# Patient Record
Sex: MALE | Race: WHITE | NOT HISPANIC OR LATINO | Employment: STUDENT | URBAN - METROPOLITAN AREA
[De-identification: names, ages, dates, MRNs, and addresses within clinical notes are randomized per-mention and may not be internally consistent; named-entity substitution may affect disease eponyms.]

---
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
## 2024-02-12 ENCOUNTER — OFFICE VISIT (OUTPATIENT)
Age: 24
End: 2024-02-12
Payer: COMMERCIAL

## 2024-02-12 ENCOUNTER — APPOINTMENT (OUTPATIENT)
Age: 24
End: 2024-02-12
Payer: COMMERCIAL

## 2024-02-12 VITALS
SYSTOLIC BLOOD PRESSURE: 132 MMHG | DIASTOLIC BLOOD PRESSURE: 60 MMHG | OXYGEN SATURATION: 98 % | HEIGHT: 72 IN | WEIGHT: 190 LBS | RESPIRATION RATE: 18 BRPM | HEART RATE: 82 BPM | TEMPERATURE: 97.4 F | BODY MASS INDEX: 25.73 KG/M2

## 2024-02-12 DIAGNOSIS — S89.92XA INJURY OF LEFT KNEE, INITIAL ENCOUNTER: Primary | ICD-10-CM

## 2024-02-12 DIAGNOSIS — S89.92XA INJURY OF LEFT KNEE, INITIAL ENCOUNTER: ICD-10-CM

## 2024-02-12 PROCEDURE — 73564 X-RAY EXAM KNEE 4 OR MORE: CPT

## 2024-02-12 PROCEDURE — 99213 OFFICE O/P EST LOW 20 MIN: CPT | Performed by: EMERGENCY MEDICINE

## 2024-02-12 NOTE — PROGRESS NOTES
[facility]        NAME: Sebas Jeronimo is a 23 y.o. male  : 2000    MRN: [id number]  DATE: 2024  TIME: 4:51 PM    Assessment and Plan   Injury of left knee, initial encounter [S89.92XA]  1. Injury of left knee, initial encounter  XR knee 4+ vw left injury    Ambulatory Referral to Physical Therapy    Ambulatory Referral to Orthopedic Surgery    Elastic Bandages & Supports (Knee Brace/Hinged/Large) MISC      knee brace hinged type applied by RN.      Patient Instructions     Patient Instructions   Swollen Knee Joint   WHAT YOU NEED TO KNOW:   A swollen knee joint may be caused by arthritis or by an injury or trauma, such as a knee sprain. It may also happen if you exercise too much. It may be painful to bend or straighten your knee, or walk.  DISCHARGE INSTRUCTIONS:   Return to the emergency department if:   Your knee locks or gives way and you fall.     Your feet or toes start to look pale or feel cold.    You cannot bear weight on your leg, or you have severe pain even after treatment.    Contact your healthcare provider if:   You have a fever.     You have redness or warmth over your knee.     The swelling does not decrease with treatment.    It gets harder or more painful to straighten your leg at the knee.    Your knee weakens, or you continue to limp.    You have questions or concerns about your condition or care.    Medicines:   NSAIDs , such as ibuprofen, help decrease swelling, pain, and fever. This medicine is available with or without a doctor's order. NSAIDs can cause stomach bleeding or kidney problems in certain people. If you take blood thinner medicine, always ask your healthcare provider if NSAIDs are safe for you. Always read the medicine label and follow directions.    Take your medicine as directed.  Contact your healthcare provider if you think your medicine is not helping or if you have side effects. Tell your provider if you are allergic to any medicine. Keep a  list of the medicines, vitamins, and herbs you take. Include the amounts, and when and why you take them. Bring the list or the pill bottles to follow-up visits. Carry your medicine list with you in case of an emergency.    What you can do to manage your symptoms:   Rest your knee.  Avoid activities that make the swelling or pain worse. You may need to avoid putting weight on your knee while you have pain. Crutches, a cane, or a walker can be used to avoid putting weight on your knee while it heals.    Apply ice to your knee to help relieve pain and swelling.  Apply ice for 15 to 20 minutes every hour or as directed. Use an ice pack, or put crushed ice in a plastic bag. Cover it with a towel before you apply it to your knee. Ice helps prevent tissue damage and decreases swelling and pain.    Compress your knee with a brace or bandage to help reduce swelling.  Use a brace or bandage only as directed.    Elevate your knee above the level of your heart as often as you can.  This will help decrease swelling and pain. Prop your joint on pillows or blankets to keep it elevated comfortably.     Apply heat to your knee to relieve pain.  Apply heat for 20 to 30 minutes every 2 hours for as many days as directed. Heat helps decrease pain.    Go to physical therapy if directed.  A physical therapist teaches you exercises to help improve movement and strength, and to decrease pain.    Follow up with your doctor as directed:  Write down your questions so you remember to ask them during your visits.  © Copyright [employer] 2023 Information is for End User's use only and may not be sold, redistributed or otherwise used for commercial purposes.  The above information is an  only. It is not intended as medical advice for individual conditions or treatments. Talk to your doctor, nurse or pharmacist before following any medical regimen to see if it is safe and effective for you.      Follow up with PCP in 3-5  "days.  Proceed to  ER if symptoms worsen.    Chief Complaint     Chief Complaint   Patient presents with    Knee Pain     Injured left knee today around 1230 while doing [employer].          History of Present Illness       Patient complains of pain left knee since twisting injury to same while practicing [employer] earlier today.  Injury was associated with a painful \"crack\".  Patient denies prior knee problems.    Knee Pain   Pertinent negatives include no numbness.       Review of Systems   Review of Systems   Constitutional:  Negative for chills and fever.   Musculoskeletal:  Positive for arthralgias and joint swelling. Negative for gait problem and myalgias.   Skin:  Negative for color change, rash and wound.   Neurological:  Negative for numbness and headaches.         Current Medications       Current Outpatient Medications:     Elastic Bandages & Supports (Knee Brace/Hinged/Large) MISC, Use daily, Disp: 1 each, Rfl: 0    Current Allergies     Allergies as of 02/12/2024    (No Known Allergies)            The following portions of the patient's history were reviewed and updated as appropriate: allergies, current medications, past family history, past medical history, past social history, past surgical history and problem list.     History reviewed. No pertinent past medical history.    History reviewed. No pertinent surgical history.    Family History   Problem Relation Age of Onset    No Known Problems Mother     No Known Problems Father          Medications have been verified.        Objective   /60   Pulse 82   Temp (!) 97.4 °F (36.3 °C)   Resp 18   Ht 6' (1.829 m)   Wt 86.2 kg (190 lb)   SpO2 98%   BMI 25.77 kg/m²        Physical Exam     Physical Exam  Vitals and nursing note reviewed.   Constitutional:       General: He is not in acute distress.     Appearance: He is well-developed. He is not ill-appearing or toxic-appearing.   HENT:      Head: Normocephalic and atraumatic.   Cardiovascular:      " Rate and Rhythm: Normal rate and regular rhythm.   Pulmonary:      Effort: Pulmonary effort is normal.      Breath sounds: Normal breath sounds.   Musculoskeletal:         General: Swelling and tenderness present. No deformity.      Cervical back: Neck supple.   Skin:     General: Skin is warm and dry.      Findings: No erythema or rash.   Neurological:      Mental Status: He is alert and oriented to person, place, and time.      Deep Tendon Reflexes: Reflexes are normal and symmetric.   Psychiatric:         Mood and Affect: Mood normal.         Behavior: Behavior normal.         Thought Content: Thought content normal.         Judgment: Judgment normal.

## 2024-02-12 NOTE — PATIENT INSTRUCTIONS
Swollen Knee Joint   WHAT YOU NEED TO KNOW:   A swollen knee joint may be caused by arthritis or by an injury or trauma, such as a knee sprain. It may also happen if you exercise too much. It may be painful to bend or straighten your knee, or walk.  DISCHARGE INSTRUCTIONS:   Return to the emergency department if:   Your knee locks or gives way and you fall.     Your feet or toes start to look pale or feel cold.    You cannot bear weight on your leg, or you have severe pain even after treatment.    Contact your healthcare provider if:   You have a fever.     You have redness or warmth over your knee.     The swelling does not decrease with treatment.    It gets harder or more painful to straighten your leg at the knee.    Your knee weakens, or you continue to limp.    You have questions or concerns about your condition or care.    Medicines:   NSAIDs , such as ibuprofen, help decrease swelling, pain, and fever. This medicine is available with or without a doctor's order. NSAIDs can cause stomach bleeding or kidney problems in certain people. If you take blood thinner medicine, always ask your healthcare provider if NSAIDs are safe for you. Always read the medicine label and follow directions.    Take your medicine as directed.  Contact your healthcare provider if you think your medicine is not helping or if you have side effects. Tell your provider if you are allergic to any medicine. Keep a list of the medicines, vitamins, and herbs you take. Include the amounts, and when and why you take them. Bring the list or the pill bottles to follow-up visits. Carry your medicine list with you in case of an emergency.    What you can do to manage your symptoms:   Rest your knee.  Avoid activities that make the swelling or pain worse. You may need to avoid putting weight on your knee while you have pain. Crutches, a cane, or a walker can be used to avoid putting weight on your knee while it heals.    Apply ice to your knee to  help relieve pain and swelling.  Apply ice for 15 to 20 minutes every hour or as directed. Use an ice pack, or put crushed ice in a plastic bag. Cover it with a towel before you apply it to your knee. Ice helps prevent tissue damage and decreases swelling and pain.    Compress your knee with a brace or bandage to help reduce swelling.  Use a brace or bandage only as directed.    Elevate your knee above the level of your heart as often as you can.  This will help decrease swelling and pain. Prop your joint on pillows or blankets to keep it elevated comfortably.     Apply heat to your knee to relieve pain.  Apply heat for 20 to 30 minutes every 2 hours for as many days as directed. Heat helps decrease pain.    Go to physical therapy if directed.  A physical therapist teaches you exercises to help improve movement and strength, and to decrease pain.    Follow up with your doctor as directed:  Write down your questions so you remember to ask them during your visits.  © Copyright [employer] 2023 Information is for End User's use only and may not be sold, redistributed or otherwise used for commercial purposes.  The above information is an  only. It is not intended as medical advice for individual conditions or treatments. Talk to your doctor, nurse or pharmacist before following any medical regimen to see if it is safe and effective for you.

## 2024-02-15 ENCOUNTER — OFFICE VISIT (OUTPATIENT)
Age: 24
End: 2024-02-15
Payer: COMMERCIAL

## 2024-02-15 VITALS
WEIGHT: 194 LBS | DIASTOLIC BLOOD PRESSURE: 65 MMHG | HEART RATE: 51 BPM | BODY MASS INDEX: 26.28 KG/M2 | SYSTOLIC BLOOD PRESSURE: 112 MMHG | HEIGHT: 72 IN

## 2024-02-15 DIAGNOSIS — M25.562 ACUTE PAIN OF LEFT KNEE: ICD-10-CM

## 2024-02-15 DIAGNOSIS — S83.422A SPRAIN OF LATERAL COLLATERAL LIGAMENT OF LEFT KNEE, INITIAL ENCOUNTER: Primary | ICD-10-CM

## 2024-02-15 DIAGNOSIS — S89.92XA INJURY OF LEFT KNEE, INITIAL ENCOUNTER: ICD-10-CM

## 2024-02-15 PROCEDURE — 99203 OFFICE O/P NEW LOW 30 MIN: CPT | Performed by: STUDENT IN AN ORGANIZED HEALTH CARE EDUCATION/TRAINING PROGRAM

## 2024-02-15 NOTE — PROGRESS NOTES
1. Sprain of lateral collateral ligament of left knee, initial encounter  Ambulatory Referral to Physical Therapy      2. Injury of left knee, initial encounter  Ambulatory Referral to Orthopedic Surgery    Ambulatory Referral to Physical Therapy      3. Acute pain of left knee  Ambulatory Referral to Physical Therapy        Orders Placed This Encounter   Procedures    Ambulatory Referral to Physical Therapy        Imaging Studies (I personally reviewed images in PACS and report):    X-ray left knee 2/12/2024: No acute osseous abnormalities.  No significant degenerative changes.  No joint effusion.    IMPRESSION:  Acute left traumatic knee pain  Secondary to varus stress along the knee while performing jujitsu maneuver  Radiographic imaging unremarkable  Clinical exam and history consistent with LCL sprain.  Differential includes lateral meniscal injury  Date of Injury: 2/12/2024    PLAN:    Clinical exam and radiographic imaging reviewed with patient today, with impression as per above. I have discussed with the patient the pathophysiology of this diagnosis and reviewed how the examination correlates with this diagnosis.    Prior imaging of left knee reviewed today as noted above.  Recommended conservative treatment at this time and starting formal physical therapy for at least a minimum of 4 to 6 weeks.  He does have a referral to physical therapy but I will place a new one today with an updated diagnoses.  Counseled he can continue as needed use of his hinged knee brace to provide support and relief of his knee pain with a goal of transitioning out over time.    In regards to pain control I counseled continued as needed use of OTC NSAIDs, acetaminophen, heat/ice therapy.  I did offer a prescription strength of NSAIDs but patient deferred this option for now.    Recommended against returning to jujitsu/contact sports at this time while rehabbing.    Return in about 4 weeks (around 3/14/2024).    Portions of the  "record may have been created with voice recognition software. Occasional wrong word or \"sound a like\" substitutions may have occurred due to the inherent limitations of voice recognition software. Read the chart carefully and recognize, using context, where substitutions have occurred.     CHIEF COMPLAINT:  Chief Complaint   Patient presents with    Left Knee - Pain, Clicking, Swelling         HPI:  Seabs Jeronimo is a 23 y.o. male  who presents for       Visit 2/15/2024:  Initial evaluation of left knee injury:  Of note patient participates in [employer]  Reports precipitating injury on 2/12/2024 from [employer].  He states his leg was being twisted in a way that was causing excessive varus stress to the knee.  He reports a popping sensation and immediate pain.  He states the pain was of mild intensity but was giving him a sense of instability while walking as if he would give out.  He states he was able to weight-bear on his left leg after the injury.  However as time went on, he noticed there was further swelling, difficulty with range of motion movements with his knee and subsequently went to urgent care.  Urgent care note reviewed.  Imaging was obtained as noted above.  Patient was prescribed a hinged knee brace and referred to physical therapy.    He reports today that there is been some improvement since his original injury and that he has improved range of motion of his knee and the swelling is mildly improved as well.  He states the pain is only intermittent and of mild intensity and when it does occur is over the lateral aspect of his knee.  Describes as an aching pain.  He does report a sense of instability and that use of the hinged knee brace has helped him to prevent a sense of imbalance.  He reports he has not been taking any OTC pain medications but has been using ice.  Denies prior injuries or surgeries of his left knee in the past.        Medical, Surgical, Family, and Social History    Past Medical " History:   Diagnosis Date    PTSD (post-traumatic stress disorder)     Substance abuse (HCC)      History reviewed. No pertinent surgical history.  Social History   Social History     Substance and Sexual Activity   Alcohol Use Not Currently     Social History     Substance and Sexual Activity   Drug Use Yes    Types: Marijuana    Comment: card     Social History     Tobacco Use   Smoking Status Never   Smokeless Tobacco Never     Family History   Problem Relation Age of Onset    No Known Problems Father     Appendicitis Mother      No Known Allergies       Physical Exam  /65   Pulse (!) 51   Ht 6' (1.829 m)   Wt 88 kg (194 lb)   BMI 26.31 kg/m²     Constitutional:  see vital signs  Gen: well-developed, normocephalic/atraumatic, well-groomed  Eyes: No inflammation or discharge of conjunctiva or lids; sclera clear   Pharynx: no inflammation, lesion, or mass of lips  Neck: supple, no masses, non-distended  MSK: no inflammation, lesion, mass, or clubbing of nails and digits except for other than mentioned below  SKIN: no visible rashes or skin lesions  Pulmonary/Chest: Effort normal. No respiratory distress.     Ortho Exam  Left Knee Exam:  Erythema: no  Swellin+  Increased Warmth: no  Tenderness: +LCL, LJL  ROM: 0-130  Knee flexion strength: 5/5  Knee extension strength: 5/5  Patellar Grind: negative  Lachman's: negative  Anterior Drawer: negative  Varus laxity: negative, but +aggravation of knee pain  Valgus laxity: negative  Yuliya: +when testing lateral meniscus  Thessaly Test: negative    No calf tenderness to palpation     Gait: Slight antalgia with use of hinged knee brace while ambulating      Procedures

## 2024-02-19 NOTE — PROGRESS NOTES
PT Evaluation     Today's date: 2024  Patient name: Sebas Jeronimo  : 2000  MRN: [id number]  Referring provider: Naeem Rizvi MD  Dx:   Encounter Diagnosis     ICD-10-CM    1. Injury of left knee, initial encounter  S89.92XA       2. Acute pain of left knee  M25.562       3. Sprain of lateral collateral ligament of left knee, initial encounter  S83.422A           Start Time: 1123  Stop Time: 1200  Total time in clinic (min): 37 minutes    Assessment  Assessment details: Sebas Jeronimo is a 23 y.o. year old male who presents to outpatient physical therapy with a primary diagnosis of acute left knee pain.  They have strength deficits  and decreased tolerance to activity . They present with the previously stated deficits which limit their ability to participate in recreational activities.  Sebas is currently functioning below their prior level of function and is a good rehab candidate who would benefit from skilled outpatient physical therapy services to allow them to reach their goals and return to PLOF, return to recreational activities, participate more fully in daily activities, and have an improved quality of life.    Physical therapist assistant (PTA) may be utilized to administer treatments as appropriate and in accordance with [facility] Physical Therapy Practice Act.    Impairments: abnormal coordination, abnormal gait, abnormal or restricted ROM, activity intolerance, impaired balance, lacks appropriate home exercise program, pain with function, poor posture  and poor body mechanics  Barriers to therapy: None   Understanding of Dx/Px/POC: good   Prognosis: good    Goals  STG to be completed in 2 weeks from 2024:  1. Sebas will be independent with initial home exercise program to allow patietn to complete exercises safely when not directly supervised by therapist.  .   2. Sebas will demonstrate good ability to elicit and maintain a quadset.   3. Sebas will report pain no  greater than 4/10 with all functional activity to allow Sebas to return to prior level of function.   4. Sebas will have full pain-free ROM in L knee.       LTG to be completed in 10 weeks from 2/20/2024 or upon discharge from OPPT:  1. Sebas will be independent with advanced home exercise program for self-management of symptoms.  2. Sebas will report 75% improvement in symptoms compared to start of POC to indicate improved functional improvement and decreased level of disability.   3. Sebas will report pain no greater than 2/10 with all functional activity to allow Sebas to return to prior level of function.   4. Sebas will have ROM 0-135* in left knee to indicate WNL ROM to allow for functional tasks and to help normalize gait pattern.   5. Sebas will report returning to sports activities with no limitations.       Plan  Patient would benefit from: skilled physical therapy  Planned modality interventions: biofeedback, cryotherapy, TENS, thermotherapy: hydrocollator packs and ultrasound  Planned therapy interventions: abdominal trunk stabilization, activity modification, ADL retraining, ADL training, balance, balance/weight bearing training, behavior modification, body mechanics training, breathing training, coordination, flexibility, functional ROM exercises, gait training, graded activity, graded exercise, graded motor, home exercise program, IADL retraining, IASTM, joint mobilization, kinesiology taping, manual therapy, massage, Snyder taping, nerve gliding, neuromuscular re-education, patient education, postural training, strengthening, stretching, therapeutic activities and therapeutic exercise  Frequency: 2x week  Duration in weeks: 10  Plan of Care beginning date: 2/20/2024  Plan of Care expiration date: 4/30/2024  Treatment plan discussed with: patient, PTA and referring physician        Subjective Evaluation    History of Present Illness  Date of onset: 2/12/2024  Mechanism of injury: Sebas  "Kandace is a 23 y.o. male. They present to outpatient physical therapy with the primary complaint of left knee pain.  They are referred to OPPT by Dr. Naeem Rizvi.  Symptoms began 2/12/2024. Sebas reports Reports precipitating injury on 2/12/2024 from [facility].  He states his leg was being twisted in a way that was causing excessive varus stress to the knee.  He reports a popping sensation and immediate pain.  He states the pain was of mild intensity but was giving him a sense of instability while walking as if he would give out.  He states he was able to weight-bear on his left leg after the injury.  However as time went on, he noticed there was further swelling, difficulty with range of motion movements with his knee and subsequently went to urgent care.  Urgent care note reviewed.  Imaging was obtained as noted above.  Patient was prescribed a hinged knee brace. Today he reports ABLE he was in a figure 4 position and pulled hard on his ankle and then had immediate knee pain and popping.  Currently he reports it is a little sore because he has been \"doing stuff\".  He reports clicking in the knee.  He did go to [facility] and he was able to do most of the things.  Pain with kneeling and putting weight on it.   He thinks initially he might have had some swelling, but does not feel it is swollen.  He is not wearing the knee brace given to him by doctor.  He reports knee felt better without the brace.  He goes back to see Doctor Dmitri next month.     Per Ortho Note: \"Of note patient participates in [facility]  Reports precipitating injury on 2/12/2024 from [facility].  He states his leg was being twisted in a way that was causing excessive varus stress to the knee.  He reports a popping sensation and immediate pain.  He states the pain was of mild intensity but was giving him a sense of instability while walking as if he would give out.  He states he was able to weight-bear on his left leg after the injury.  However as time " "went on, he noticed there was further swelling, difficulty with range of motion movements with his knee and subsequently went to urgent care.  Urgent care note reviewed.  Imaging was obtained as noted above.  Patient was prescribed a hinged knee brace and referred to physical therapy.     He reports today that there is been some improvement since his original injury and that he has improved range of motion of his knee and the swelling is mildly improved as well.  He states the pain is only intermittent and of mild intensity and when it does occur is over the lateral aspect of his knee.  Describes as an aching pain.  He does report a sense of instability and that use of the hinged knee brace has helped him to prevent a sense of imbalance.  He reports he has not been taking any OTC pain medications but has been using ice.  Denies prior injuries or surgeries of his left knee in the past.\"    Patient Goals  Patient goals for therapy: decreased pain, increased strength and return to sport/leisure activities  Patient goal: \"to get better faster, get back to [employer] [facility] time, do some strengthening to be able to be athletic\"  Pain  Current pain ratin  At best pain ratin (morning after injury)  At worst pain ratin  Location: lateral aspect left knee  Quality: sore, shart with extension.  Alleviating factors: none - has not used ice, heat or meds.  Exacerbated by: straightening knee, rotation, and putting weight on it.    Social Support  Stairs in house: yes (no issues on stairs)   Lives in: multiple-level home  Lives with: parents    Employment status: not working (in school full time- studying sociology)  Exercise comments: [employer] daily.      Diagnostic Tests  X-ray: normal (2024)  Treatments  No previous or current treatments        Objective     Palpation     Additional Palpation Details  TTP distal LCL LLE     Active Range of Motion   Left Knee   Flexion: 130 (pain at end range) degrees " "  Extension: 3 (lacking) degrees     Right Knee   Flexion: 135 degrees     Mobility   Patellar Mobility:   Left Knee   WFL: medial, lateral, superior and inferior.     Strength/Myotome Testing     Left Knee   Flexion: 4+  Extension: 4+ (sore)  Quadriceps contraction: good    Right Knee   Flexion: 4+  Extension: 4+  Quadriceps contraction: good    Additional Strength Details  L hip abd: tested above knee due to pain 4+/5  R hip Abd: 4+/5    L hip flexion 4+/5  R hip flexion 4+/5    Tests     Left Knee   Positive varus stress test at 0 degrees.   Negative anterior drawer, posterior drawer, valgus stress test at 0 degrees, valgus stress test at 30 degrees and varus stress test at 30 degrees.     Ambulation     Comments   Gait unremarkable although pt walking in slide flip flops so likely altered gait pattern due to this       Flowsheet Rows      Flowsheet Row Most Recent Value   PT/OT G-Codes    Current Score 70   Projected Score 89               Precautions: standard    Access Code: [email or web address]  URL: [email or web address]  Date: 02/20/2024  Prepared by: Maye Reyes    Exercises  - Long Sitting Quad Set  - 2 x daily - 1 sets - 10 reps - 10 second  hold  - Seated Long Arc Quad  - 2 x daily - 1 sets - 10-20 reps      Date: 2/20/2024            Visit: #1 #2 #3 #4 #5 #6 #7 #8 #9 #10   Manual:                          Patient Education:                                       Neuro Re-Ed             QS  10\"x10 HEP            Standing hip 3 ways              Clams              SLR             Step ups              Step down              SLS balance                                        Ther Ex             Warm Up  Bike                                                                                                       Ther Activity                                       Gait Training                                       Modalities                                           "

## 2024-02-20 ENCOUNTER — EVALUATION (OUTPATIENT)
Age: 24
End: 2024-02-20
Payer: COMMERCIAL

## 2024-02-20 DIAGNOSIS — S83.422A SPRAIN OF LATERAL COLLATERAL LIGAMENT OF LEFT KNEE, INITIAL ENCOUNTER: ICD-10-CM

## 2024-02-20 DIAGNOSIS — M25.562 ACUTE PAIN OF LEFT KNEE: ICD-10-CM

## 2024-02-20 DIAGNOSIS — S89.92XA INJURY OF LEFT KNEE, INITIAL ENCOUNTER: Primary | ICD-10-CM

## 2024-02-20 PROCEDURE — 97530 THERAPEUTIC ACTIVITIES: CPT

## 2024-02-20 PROCEDURE — 97161 PT EVAL LOW COMPLEX 20 MIN: CPT

## 2024-02-27 NOTE — PROGRESS NOTES
Daily Note     Today's date: 2024  Patient name: Sebas Jeronimo  : 2000  MRN: [id number]  Referring provider: Naeem Rizvi MD  Dx:   Encounter Diagnosis     ICD-10-CM    1. Injury of left knee, initial encounter  S89.92XA       2. Sprain of lateral collateral ligament of left knee, initial encounter  S83.422A       3. Acute pain of left knee  M25.562           Start Time: 1203  Stop Time: 1241  Total time in clinic (min): 38 minutes    Subjective: Pt reports knee is doing well.  He has no pain coming in today.  Overall doing well.  He has been doing to gym and lifting.  Has not been doing HEP.  He has been participating in martial arts with minimal limitations.       Objective: See treatment diary below      Assessment: Sebas Jeronimo tolerated today's treatment session well.  Patient education provided on continuation of POC and TE.  Sebas completed all TE with good form and no adverse reactions. Pt had no pain with any exercises today.  Sebas continues to benefit from skilled OPPT services to address knee pain. Will continue to address functional deficits and focus on progression of POC per patient tolerance.      Patient performed Recumbent bike to increase blood flow to the area being treated, prepare the muscles for strength training and stretching, improve overall tolerance to activity, and aerobic endurance.      Plan: Continue per plan of care.  Progress treatment as tolerated.       Precautions: standard    Access Code: [email or web address]  URL: [email or web address]  Date: 2024  Prepared by: Maye Reyes    Exercises  - Long Sitting Quad Set  - 2 x daily - 1 sets - 10 reps - 10 second  hold  - Seated Long Arc Quad  - 2 x daily - 1 sets - 10-20 reps  - Single Leg Stance with 3-Way Kick on Foam  - 2 x daily - 2 sets - 10 reps  - Clam  - 1-2 x daily - 2 sets - 20 reps  - Lateral Step Down  - 1-2 x daily - 2 sets - 10-20 reps  - Side Stepping with Resistance at Thighs  - 1-2 x daily  "- 2 sets - 10 reps  - Squat with Chair Touch  - 1-2 x daily - 2 sets - 20 reps    Date: 2/20/2024 2/29/2024           Visit: #1 #2 #3 #4 #5 #6 #7 #8 #9 #10   Manual:                          Patient Education:                                       Neuro Re-Ed             QS  10\"x10 HEP            Standing hip 3 ways   On airex x10 each LE            Clams   X20 each side            SLR             Step ups   12\" box x20            Step down   Lateral 6\" box x15            SLS balance              Squat to box   18\" box x20           Lateral walk with band   CO Red 1 long lap            Stool walking   2 laps                         Ther Ex             Warm Up  Bike level 1 10 min                                                                                                       Ther Activity                                       Gait Training                                       Modalities                                                "

## 2024-02-29 ENCOUNTER — OFFICE VISIT (OUTPATIENT)
Age: 24
End: 2024-02-29
Payer: COMMERCIAL

## 2024-02-29 DIAGNOSIS — S83.422A SPRAIN OF LATERAL COLLATERAL LIGAMENT OF LEFT KNEE, INITIAL ENCOUNTER: ICD-10-CM

## 2024-02-29 DIAGNOSIS — S89.92XA INJURY OF LEFT KNEE, INITIAL ENCOUNTER: Primary | ICD-10-CM

## 2024-02-29 DIAGNOSIS — M25.562 ACUTE PAIN OF LEFT KNEE: ICD-10-CM

## 2024-02-29 PROCEDURE — 97112 NEUROMUSCULAR REEDUCATION: CPT

## 2024-02-29 PROCEDURE — 97530 THERAPEUTIC ACTIVITIES: CPT
